# Patient Record
(demographics unavailable — no encounter records)

---

## 2024-10-17 NOTE — ASSESSMENT
[FreeTextEntry1] :    since not improving with PT will get mri to eval blayne`l tearing and poss need for surgery    - We discussed their diagnosis and treatment options at length including the risks and benefits of both surgical and non-surgical options. Surgical risks include but are not limited to pain, infection, bleeding, vascular injury, numbness, tingling, nerve damage. - Due to risks of surgery, they will continue conservative treatment with PT, icing, and anti-inflammatory medications - The patient was provided with a prescription to work on scapular strengthening and rotator cuff strengthening. - The patient was advised to let pain guide the gradual advancement of activities. - naprosyn rx - Patient was given a prescription for an anti-inflammatory medication.  They will take it for the next week and then on an as needed basis, as long as there are no medical contra-indications.  Patient is counseled on possible GI, renal, and cardiovascular side effects. - mri to eval labral tear - fu after mri

## 2024-10-17 NOTE — IMAGING
[de-identified] : NECK: Inspection: no ecchymosis.  Palpation: trapezial tenderness.  Range of motion:  Full range of motion with mild stiffness . Pain at extremes of rotation to right.  Strength Testing: motor exam is non-focal throughout both lower extremities Normal Deltoid, Biceps, Triceps, Wrist Flexors, Finger Abductors, Grasp  Neurological testing: light touch is intact throughout both upper extremities Hayes reflex: neg Spurling test: neg    RIGHT SHOULDER Inspection: No swelling.  Palpation: Tenderness is noted at the bicipital groove, anterior and lateral.  Range of motion: There is pain with range of motion. , ER 55, @90ER 90, @90IR 30 Strength: There is pain and discomfort with strength testing. Forward Flexion 4/5. Abduction 4/5.  External Rotation 5-/5 and Internal Rotation 5/5  Neurological testings: motor and sensor intact distally. Ligament Stability and Special Tests:  There is positive arc of pain.  Shoulder apprehension: neg Shoulder relocation: neg Obriens test: pos Biceps Active test: neg Otto Labral Shear: neg Impingement testing: pos Shannan testing: pos Whipple: pos Cross Body Adduction: neg

## 2024-10-17 NOTE — HISTORY OF PRESENT ILLNESS
[de-identified] : 26 year old male  (RHD, , works out , sonya [employer]rews)  chronic right shoulder pain worsening since 2/2024 with no speicfic OMAR The pain is located anterior shoulder ,upper trap, neck and lateral shoulder The pain is associated with radiation, popping at shoulder   Worse with circumduction, overhead activity and better at rest. Has tried icing Advil, activity mod  10/17/24-  went to PT at [facility] in [place], and doing HEP but cont clicking and and popping and sense of instabiltiy

## 2024-11-20 NOTE — ASSESSMENT
[FreeTextEntry1] :  mri right shoulder 11/12/24 - slap , synov, bicep tendonits, rtc tendinopathy, ac deg   - We discussed their diagnosis and treatment options at length including the risks and benefits of both surgical and non-surgical options. Surgical risks include but are not limited to pain, infection, bleeding, vascular injury, numbness, tingling, nerve damage. - Given their pain and instability events and failure to improve with previous non-operative treatment, they are a candidate for surgery in order to stabilize their shoulder and prevent further chondral damage and the long term development of arthritis. - The pros and cons along with the risks, benefits, and alternatives to right shoulder surgical arthroscopy with labral repair and poss bicep TD vs TT were discussed with the patient, all questions were answered.

## 2024-11-20 NOTE — HISTORY OF PRESENT ILLNESS
[de-identified] : 27 year old male  (RHD, , works out , sonya deidreletitia)  chronic right shoulder pain worsening since 2/2024 with no speicfic OMAR The pain is located anterior shoulder ,upper trap, neck and lateral shoulder The pain is associated with radiation, popping at shoulder   Worse with circumduction, overhead activity and better at rest. Has tried icing Advil, activity mod  10/17/24-  went to PT at [facility] in [place], and doing HEP but cont clicking and and popping and sense of instabiltiy 11/21/24 - cont pain and clickng and sense of instability , had mri showing labral tear

## 2024-11-20 NOTE — DISCUSSION/SUMMARY
[de-identified] : The patient was advised of the diagnosis. The natural history of the pathology was explained in full to the patient in layman's terms. Several different treatment options were discussed and explained to the patient including the risks and benefits of both surgical and non-surgical treatments.   I do think they are a candidate for surgery, given their instability and failure to improve with previous non-operative treatment in order to stabilize their shoulder and prevent further chondral damage and the long term development of arthritis. We discussed that the goal of surgery is pain relief along with improved function, stability and motion.  The risks, benefits, and alternatives to surgical arthroscopy right shoulder with labral repair and possible bicep tenodesis vs tenotomy were reviewed with the patient. Risks of surgery were outlined in full to the patient including but not limited to pain, infection (superficial or deep), bleeding, vascular injury, numbness, tingling, nerve damage (direct or indirect), scarring, stiffness, non-healing, wound breakdown, failure to resolve symptoms, symptom recurrence, the need for further surgery, as well as medical complications such as blood clots, pulmonary embolism, heart attack, stroke, and other anesthesia complications including even death. The patient understood and accepted these risks and that other complications not mentioned above could occur.  They understands that 100% recovery is not assured and the desired level of function may not be achievable. We discussed the potential for prolonged recovery course and the potential for this to affect their activities, which could include sports/work regimen.  The intraoperative plan, post-operative plan, post-operative expectations and limitations were explained in full. The importance of postoperative rehabilitation and restriction compliance was emphasized. Expectations from non-surgical treatment were explained in full as well. The patient demonstrated a complete understanding of the treatment alternatives and requested to proceed with surgery. This will be scheduled accordingly.

## 2024-11-20 NOTE — IMAGING
[de-identified] : NECK: Inspection: no ecchymosis.  Palpation: trapezial tenderness.  Range of motion:  Full range of motion with mild stiffness . Pain at extremes of rotation to right.  Strength Testing: motor exam is non-focal throughout both lower extremities Normal Deltoid, Biceps, Triceps, Wrist Flexors, Finger Abductors, Grasp  Neurological testing: light touch is intact throughout both upper extremities Hayes reflex: neg Spurling test: neg    RIGHT SHOULDER Inspection: No swelling.  Palpation: Tenderness is noted at the bicipital groove, anterior and lateral.  Range of motion: There is pain with range of motion. , ER 55, @90ER 90, @90IR 30 Strength: There is pain and discomfort with strength testing. Forward Flexion 4/5. Abduction 4/5.  External Rotation 5-/5 and Internal Rotation 5/5  Neurological testings: motor and sensor intact distally. Ligament Stability and Special Tests:  There is positive arc of pain.  Shoulder apprehension: neg Shoulder relocation: neg Obriens test: pos Biceps Active test: neg Otto Labral Shear: neg Impingement testing: pos Shannan testing: pos Whipple: pos Cross Body Adduction: neg

## 2024-11-20 NOTE — DISCUSSION/SUMMARY
[de-identified] : The patient was advised of the diagnosis. The natural history of the pathology was explained in full to the patient in layman's terms. Several different treatment options were discussed and explained to the patient including the risks and benefits of both surgical and non-surgical treatments.   I do think they are a candidate for surgery, given their instability and failure to improve with previous non-operative treatment in order to stabilize their shoulder and prevent further chondral damage and the long term development of arthritis. We discussed that the goal of surgery is pain relief along with improved function, stability and motion.  The risks, benefits, and alternatives to surgical arthroscopy right shoulder with labral repair and possible bicep tenodesis vs tenotomy were reviewed with the patient. Risks of surgery were outlined in full to the patient including but not limited to pain, infection (superficial or deep), bleeding, vascular injury, numbness, tingling, nerve damage (direct or indirect), scarring, stiffness, non-healing, wound breakdown, failure to resolve symptoms, symptom recurrence, the need for further surgery, as well as medical complications such as blood clots, pulmonary embolism, heart attack, stroke, and other anesthesia complications including even death. The patient understood and accepted these risks and that other complications not mentioned above could occur.  They understands that 100% recovery is not assured and the desired level of function may not be achievable. We discussed the potential for prolonged recovery course and the potential for this to affect their activities, which could include sports/work regimen.  The intraoperative plan, post-operative plan, post-operative expectations and limitations were explained in full. The importance of postoperative rehabilitation and restriction compliance was emphasized. Expectations from non-surgical treatment were explained in full as well. The patient demonstrated a complete understanding of the treatment alternatives and requested to proceed with surgery. This will be scheduled accordingly.

## 2024-11-20 NOTE — HISTORY OF PRESENT ILLNESS
[de-identified] : 27 year old male  (RHD, , works out , sonya deidreletitia)  chronic right shoulder pain worsening since 2/2024 with no speicfic OMAR The pain is located anterior shoulder ,upper trap, neck and lateral shoulder The pain is associated with radiation, popping at shoulder   Worse with circumduction, overhead activity and better at rest. Has tried icing Advil, activity mod  10/17/24-  went to PT at [facility] in [place], and doing HEP but cont clicking and and popping and sense of instabiltiy 11/21/24 - cont pain and clickng and sense of instability , had mri showing labral tear

## 2024-11-20 NOTE — IMAGING
[de-identified] : NECK: Inspection: no ecchymosis.  Palpation: trapezial tenderness.  Range of motion:  Full range of motion with mild stiffness . Pain at extremes of rotation to right.  Strength Testing: motor exam is non-focal throughout both lower extremities Normal Deltoid, Biceps, Triceps, Wrist Flexors, Finger Abductors, Grasp  Neurological testing: light touch is intact throughout both upper extremities Hayes reflex: neg Spurling test: neg    RIGHT SHOULDER Inspection: No swelling.  Palpation: Tenderness is noted at the bicipital groove, anterior and lateral.  Range of motion: There is pain with range of motion. , ER 55, @90ER 90, @90IR 30 Strength: There is pain and discomfort with strength testing. Forward Flexion 4/5. Abduction 4/5.  External Rotation 5-/5 and Internal Rotation 5/5  Neurological testings: motor and sensor intact distally. Ligament Stability and Special Tests:  There is positive arc of pain.  Shoulder apprehension: neg Shoulder relocation: neg Obriens test: pos Biceps Active test: neg Otto Labral Shear: neg Impingement testing: pos Shannan testing: pos Whipple: pos Cross Body Adduction: neg